# Patient Record
(demographics unavailable — no encounter records)

---

## 2025-05-05 NOTE — HEALTH RISK ASSESSMENT
[Good] : ~his/her~ current health as good [Yes] : Yes [2 - 4 times a month (2 pts)] : 2-4 times a month (2 points) [1 or 2 (0 pts)] : 1 or 2 (0 points) [Less than monthly (1 pt)] : Less than monthly (1 point) [No falls in past year] : Patient reported no falls in the past year [Little interest or pleasure doing things] : 1) Little interest or pleasure doing things [Feeling down, depressed, or hopeless] : 2) Feeling down, depressed, or hopeless [1] : 2) Feeling down, depressed, or hopeless for several days (1) [1/2 of Days or More (2)] : 5.) Poor appetite or overeating? Half the days or more [Several Days (1)] : 7.) Trouble concentrating on things, such as reading a newspaper or watching television? Several days [Not at All (0)] : 9.) Thoughts that you would be off dead or of hurting yourself in some way? Not at all [Mild] : Severity of Depression is Mild [Not at all] : How difficult have these problems made it for you to do your work, take care of things at home, or get along with people? Not at all [With Family] : lives with family [College] : College [Significant Other] : lives with significant other [Sexually Active] : sexually active [Feels Safe at Home] : Feels safe at home [Fully functional (bathing, dressing, toileting, transferring, walking, feeding)] : Fully functional (bathing, dressing, toileting, transferring, walking, feeding) [Fully functional (using the telephone, shopping, preparing meals, housekeeping, doing laundry, using] : Fully functional and needs no help or supervision to perform IADLs (using the telephone, shopping, preparing meals, housekeeping, doing laundry, using transportation, managing medications and managing finances) [Reports changes in vision] : Reports changes in vision [Reports normal functional visual acuity (ie: able to read med bottle)] : Reports normal functional visual acuity [Reports changes in dental health] : Reports changes in dental health [Smoke Detector] : smoke detector [Carbon Monoxide Detector] : carbon monoxide detector [Seat Belt] :  uses seat belt [Sunscreen] : uses sunscreen [Current] : Current [0-4] : 0-4 [NO] : No [[id number]] : 2 [[id number]] : 9 [Change in mental status noted] : No change in mental status noted [High Risk Behavior] : no high risk behavior [Reports changes in hearing] : Reports no changes in hearing [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

## 2025-05-05 NOTE — REVIEW OF SYSTEMS
[Joint Pain] : joint pain [Back Pain] : back pain [Negative] : Heme/Lymph [FreeTextEntry9] : + Left elbow pain

## 2025-05-05 NOTE — PLAN
[FreeTextEntry1] : Patient is a 23 yo male presenting to establish care and CPE. Patient feels well overall.   CPE - Reviewed routine blood work  - PHQ9- score 9, patient states he is able to manage symptoms, will reach out if needing to speak with therapist- states he does not need currently - ophtho- 2025 - Dentist- 2/2025  - Derm- ordered referral  - Vaccine- will provide records  Back pain, L elbow pain  - No swelling or erythema on exam - likely inflammatory and muscular in nature given  - Ortho referral ordered for further eval and imaging - Patient offered PT- would like to see ortho first

## 2025-05-05 NOTE — HISTORY OF PRESENT ILLNESS
[de-identified] : Patient is a 21 yo male presenting to establish care and CPE. Patient feels well overall.  Patient endorses back pain and L elbow pain since beginning of March- states he was helping sibling move and was lifting heavy packages. Patient is currently weight training and goes to the sauna almost daily.   ophtho- 2025 Dentist- 2/2025  Derm- does not follow  Vaccine- will provide records

## 2025-05-23 NOTE — PHYSICAL EXAM
[DP] : dorsalis pedis 2+ and symmetric bilaterally [PT] : posterior tibial 2+ and symmetric bilaterally [Rad] : radial 2+ and symmetric bilaterally [Normal] : Alert and in no acute distress [Poor Appearance] : well-appearing [Acute Distress] : not in acute distress [Obese] : not obese [de-identified] : The patient has no respiratory distress. Mood and affect are normal. The patient is alert and oriented to person, place and time. Examination of the cervical spine demonstrates no tenderness, no deformity and no muscle spasm. Cervical spine rotation is 60 to the right, 60 to the left, 75 of extension and 45 of flexion. Neurologic exam of the upper extremities reveals intact sensation to light touch. Motor function is 5 over 5 in all groups. Deep tendon reflexes are 2+ and equal at the biceps, triceps and brachioradialis. Examination of the left shoulder demonstrates no swelling, no deformity and no tenderness. The shoulder is stable. Drop arm test is negative. [employer]tson test is negative. Liftoff test is negative. Motor strength is 5 over 5 in all groups. Range of motion is full and identical to that of the right shoulder. Flexion is 160, abduction 160, external rotation 45 and internal rotation to the lower thoracic level. Examination of the left elbow demonstrates no tenderness.  There is no swelling or deformity.  There is no instability.  He has no pain with flexion, extension, rotation of the left elbow and forearm.  There is no pain with wrist motion.  The skin is intact.  There is no lymphedema. Examination of the lumbar spine demonstrates tenderness to the left of the midline. There is mild muscle spasm. There is no deformity. Lumbar flexion is 90, right lateral flexion 10 and left lateral flexion 10. Straight leg raise test is negative. Lower extremity neurologic exam is intact with regard to sensation, motor function and deep tendon reflexes. There is no pain with rotation of the hips.  There is no pain with motion of the knees.  Calves are soft and nontender.  The skin is intact.  There is no lymphedema. [de-identified] : AP and lateral x-rays of the lumbar spine demonstrate no fracture, no dislocation and no bony abnormality. AP, lateral and oblique x-rays of the left elbow demonstrate no fracture, no dislocation and no bony abnormality.

## 2025-05-23 NOTE — HISTORY OF PRESENT ILLNESS
[de-identified] : 22-year-old male presents for evaluation of low back and left elbow pain x 3 months. Denies particular trauma or injury. He complains of intermittent aching pain in the low back, L>R, worse with bending down and prolonged sitting. Denies numbness or tingling in the lower extremities. He complains of intermittent pain over the lateral elbow worse with flexion of the elbow and pronation. He has tried heat and resting with some relief. Denies prior injuries.

## 2025-05-23 NOTE — DISCUSSION/SUMMARY
[de-identified] : The patient had a sprain to the lumbar spine.  The patient had a sprain to the left elbow.  I have discussed the pathology, natural history and treatment options with him.  He is referred for physical therapy.  He may take over-the-counter medicine as needed.  If still symptomatic in 6 weeks he should be reevaluated.